# Patient Record
Sex: FEMALE | Race: OTHER | HISPANIC OR LATINO | Employment: OTHER | URBAN - METROPOLITAN AREA
[De-identification: names, ages, dates, MRNs, and addresses within clinical notes are randomized per-mention and may not be internally consistent; named-entity substitution may affect disease eponyms.]

---

## 2021-04-30 ENCOUNTER — HOSPITAL ENCOUNTER (EMERGENCY)
Facility: HOSPITAL | Age: 71
Discharge: HOME | End: 2021-05-01
Attending: EMERGENCY MEDICINE | Admitting: EMERGENCY MEDICINE
Payer: COMMERCIAL

## 2021-04-30 DIAGNOSIS — F11.10 OPIOID ABUSE (HCC): Primary | ICD-10-CM

## 2021-04-30 DIAGNOSIS — I10 HTN (HYPERTENSION): ICD-10-CM

## 2021-04-30 DIAGNOSIS — F11.13 OPIOID ABUSE WITH WITHDRAWAL (HCC): ICD-10-CM

## 2021-04-30 LAB
AMPHETAMINES SERPL QL SCN: NEGATIVE
ANION GAP SERPL CALCULATED.3IONS-SCNC: 4 MMOL/L (ref 5–14)
ATRIAL RATE: 56 BPM
BACTERIA UR QL AUTO: ABNORMAL /HPF
BARBITURATES UR QL: NEGATIVE
BASOPHILS # BLD AUTO: 0 THOUSANDS/ΜL (ref 0–0.1)
BASOPHILS NFR BLD AUTO: 1 % (ref 0–1)
BENZODIAZ UR QL: NEGATIVE
BILIRUB UR QL STRIP: NEGATIVE
BUN SERPL-MCNC: 28 MG/DL (ref 5–25)
CALCIUM SERPL-MCNC: 10 MG/DL (ref 8.4–10.2)
CHLORIDE SERPL-SCNC: 101 MMOL/L (ref 97–108)
CLARITY UR: CLEAR
CO2 SERPL-SCNC: 33 MMOL/L (ref 22–30)
COCAINE UR QL: NEGATIVE
COLOR UR: YELLOW
CREAT SERPL-MCNC: 0.91 MG/DL (ref 0.6–1.2)
EOSINOPHIL # BLD AUTO: 0.1 THOUSAND/ΜL (ref 0–0.4)
EOSINOPHIL NFR BLD AUTO: 2 % (ref 0–6)
ERYTHROCYTE [DISTWIDTH] IN BLOOD BY AUTOMATED COUNT: 13.7 %
ETHANOL EXG-MCNC: 0 MG/DL
GFR SERPL CREATININE-BSD FRML MDRD: 64 ML/MIN/1.73SQ M
GLUCOSE SERPL-MCNC: 121 MG/DL (ref 70–99)
GLUCOSE UR STRIP-MCNC: NEGATIVE MG/DL
HCT VFR BLD AUTO: 39 % (ref 36–46)
HGB BLD-MCNC: 13.1 G/DL (ref 12–16)
HGB UR QL STRIP.AUTO: 10
KETONES UR STRIP-MCNC: NEGATIVE MG/DL
LEUKOCYTE ESTERASE UR QL STRIP: 500
LYMPHOCYTES # BLD AUTO: 1.6 THOUSANDS/ΜL (ref 0.5–4)
LYMPHOCYTES NFR BLD AUTO: 33 % (ref 25–45)
MCH RBC QN AUTO: 33.5 PG (ref 26–34)
MCHC RBC AUTO-ENTMCNC: 33.7 G/DL (ref 31–36)
MCV RBC AUTO: 99 FL (ref 80–100)
METHADONE UR QL: NEGATIVE
MONOCYTES # BLD AUTO: 0.4 THOUSAND/ΜL (ref 0.2–0.9)
MONOCYTES NFR BLD AUTO: 8 % (ref 1–10)
MUCOUS THREADS UR QL AUTO: ABNORMAL
NEUTROPHILS # BLD AUTO: 2.8 THOUSANDS/ΜL (ref 1.8–7.8)
NEUTS SEG NFR BLD AUTO: 57 % (ref 45–65)
NITRITE UR QL STRIP: NEGATIVE
NON-SQ EPI CELLS URNS QL MICRO: ABNORMAL /HPF
OPIATES UR QL SCN: POSITIVE
OXYCODONE+OXYMORPHONE UR QL SCN: NEGATIVE
P AXIS: 43 DEGREES
PCP UR QL: NEGATIVE
PH UR STRIP.AUTO: 6.5 [PH]
PLATELET # BLD AUTO: 165 THOUSANDS/UL (ref 150–450)
PMV BLD AUTO: 8 FL (ref 8.9–12.7)
POTASSIUM SERPL-SCNC: 4 MMOL/L (ref 3.6–5)
PR INTERVAL: 174 MS
PROT UR STRIP-MCNC: NEGATIVE MG/DL
QRS AXIS: 9 DEGREES
QRSD INTERVAL: 92 MS
QT INTERVAL: 412 MS
QTC INTERVAL: 397 MS
RBC # BLD AUTO: 3.92 MILLION/UL (ref 4–5.2)
RBC #/AREA URNS AUTO: ABNORMAL /HPF
SARS-COV-2 RNA RESP QL NAA+PROBE: NEGATIVE
SODIUM SERPL-SCNC: 138 MMOL/L (ref 137–147)
SP GR UR STRIP.AUTO: 1.01 (ref 1–1.04)
T WAVE AXIS: 66 DEGREES
THC UR QL: NEGATIVE
UROBILINOGEN UA: NEGATIVE MG/DL
VENTRICULAR RATE: 56 BPM
WBC # BLD AUTO: 4.9 THOUSAND/UL (ref 4.5–11)
WBC #/AREA URNS AUTO: ABNORMAL /HPF

## 2021-04-30 PROCEDURE — 80307 DRUG TEST PRSMV CHEM ANLYZR: CPT | Performed by: PHYSICIAN ASSISTANT

## 2021-04-30 PROCEDURE — U0005 INFEC AGEN DETEC AMPLI PROBE: HCPCS | Performed by: PHYSICIAN ASSISTANT

## 2021-04-30 PROCEDURE — 36415 COLL VENOUS BLD VENIPUNCTURE: CPT | Performed by: PHYSICIAN ASSISTANT

## 2021-04-30 PROCEDURE — 80048 BASIC METABOLIC PNL TOTAL CA: CPT | Performed by: PHYSICIAN ASSISTANT

## 2021-04-30 PROCEDURE — 81003 URINALYSIS AUTO W/O SCOPE: CPT | Performed by: PHYSICIAN ASSISTANT

## 2021-04-30 PROCEDURE — 99284 EMERGENCY DEPT VISIT MOD MDM: CPT

## 2021-04-30 PROCEDURE — 82075 ASSAY OF BREATH ETHANOL: CPT | Performed by: PHYSICIAN ASSISTANT

## 2021-04-30 PROCEDURE — 85025 COMPLETE CBC W/AUTO DIFF WBC: CPT | Performed by: PHYSICIAN ASSISTANT

## 2021-04-30 PROCEDURE — 93010 ELECTROCARDIOGRAM REPORT: CPT | Performed by: INTERNAL MEDICINE

## 2021-04-30 PROCEDURE — U0003 INFECTIOUS AGENT DETECTION BY NUCLEIC ACID (DNA OR RNA); SEVERE ACUTE RESPIRATORY SYNDROME CORONAVIRUS 2 (SARS-COV-2) (CORONAVIRUS DISEASE [COVID-19]), AMPLIFIED PROBE TECHNIQUE, MAKING USE OF HIGH THROUGHPUT TECHNOLOGIES AS DESCRIBED BY CMS-2020-01-R: HCPCS | Performed by: PHYSICIAN ASSISTANT

## 2021-04-30 PROCEDURE — 81001 URINALYSIS AUTO W/SCOPE: CPT | Performed by: PHYSICIAN ASSISTANT

## 2021-04-30 PROCEDURE — 93005 ELECTROCARDIOGRAM TRACING: CPT

## 2021-04-30 RX ORDER — INDOMETHACIN 50 MG/1
CAPSULE ORAL
COMMUNITY
Start: 2021-03-29

## 2021-04-30 RX ORDER — HYDROCHLOROTHIAZIDE 25 MG/1
25 TABLET ORAL DAILY
COMMUNITY
Start: 2020-10-09 | End: 2021-05-01

## 2021-04-30 RX ORDER — NITROFURANTOIN 25; 75 MG/1; MG/1
100 CAPSULE ORAL 2 TIMES DAILY WITH MEALS
Status: DISCONTINUED | OUTPATIENT
Start: 2021-04-30 | End: 2021-05-01 | Stop reason: HOSPADM

## 2021-04-30 RX ORDER — BRIMONIDINE TARTRATE 2 MG/ML
SOLUTION/ DROPS OPHTHALMIC
COMMUNITY
Start: 2021-01-27

## 2021-04-30 RX ORDER — CLONIDINE HYDROCHLORIDE 0.1 MG/1
0.1 TABLET ORAL ONCE
Status: COMPLETED | OUTPATIENT
Start: 2021-04-30 | End: 2021-04-30

## 2021-04-30 RX ORDER — SIMVASTATIN 40 MG
TABLET ORAL
COMMUNITY
Start: 2020-12-04

## 2021-04-30 RX ORDER — LOSARTAN POTASSIUM 100 MG/1
100 TABLET ORAL
COMMUNITY
Start: 2021-03-25

## 2021-04-30 RX ORDER — HYDROCHLOROTHIAZIDE 25 MG/1
25 TABLET ORAL DAILY
Status: DISCONTINUED | OUTPATIENT
Start: 2021-05-01 | End: 2021-04-30

## 2021-04-30 RX ADMIN — NITROFURANTOIN (MONOHYDRATE/MACROCRYSTALS) 100 MG: 75; 25 CAPSULE ORAL at 19:10

## 2021-04-30 RX ADMIN — CLONIDINE HYDROCHLORIDE 0.1 MG: 0.1 TABLET ORAL at 20:20

## 2021-04-30 NOTE — ED PROVIDER NOTES
History  Chief Complaint   Patient presents with    Detox Evaluation     pt arrives requesting HOST services for daily heroin abuse, pt reports 3-5 bags daily  Used 3 today  Had a bed at [facility] however her BP caused them to deny her admission  79-year-old female with a past medical history of heroin abuse,anxiety, hypothyroidism, hypertension presents to the ED for evaluation of opioid detox and drug rehabilitation  Patient reports she has been using heroin for over 20 years  Last use this morning 3 bags, snorted  Patient typically uses 3 bags daily  Patient's daughter is at bedside and reports that she had a ED bed today at [place] however due to her elevated blood pressure he denied her admission  Was advised to come to the emergency room where there is a detox unit that can manage her blood pressure and 8 in detox  Patient reports she previously was sober from opiates 23 years ago, and lasted 20 years sober, and began using 3 years ago  Patient recalls vividly having withdrawal like symptoms that included nausea, vomiting, myalgia, chills, and fevers  At this time patient denies any withdrawal symptoms  Denies any fever, chills, chest pain, dyspnea, abdominal pain  Patient denies any suicidal and homicidal ideation  Patient reports poor compliance with home medications        History provided by:  Patient and relative   used: No    Detox Evaluation  Similar prior episodes: yes    Severity:  Mild  Duration:  1 day  Timing:  Constant  Progression:  Worsening  Chronicity:  New  Suspected agents:  Heroin  Associated symptoms: no abdominal pain, no agitation, no blackouts, no bladder incontinence, no bowel incontinence, no confusion, no hallucinations, no headaches, no loss of consciousness, no nausea, no palpitations, no seizures, no shortness of breath, no somnolence, no suicidal ideation, no violence, no vomiting and no weakness        Prior to Admission Medications Prescriptions Last Dose Informant Patient Reported? Taking? Aspirin Buf,CaCarb-MgCarb-MgO, 81 MG TABS   Yes No   Sig: Take 81 mg by mouth daily   Diclofenac Sodium (VOLTAREN) 1 %   Yes Yes   Sig: APPLY TO AFFECTED AREA AS NEEDED   amLODIPine (NORVASC) 10 mg tablet   Yes Yes   Sig: Take 10 mg by mouth daily   brimonidine tartrate 0 2 % ophthalmic solution   Yes Yes   Sig: INSTILL 1 DROP IN EACH EYE TWICE DAILY   citalopram (CeleXA) 20 mg tablet   Yes Yes   Sig: Take 20 mg by mouth daily   gabapentin (NEURONTIN) 300 mg capsule   Yes Yes   Sig: Take 300 mg by mouth 3 (three) times a day   indomethacin (INDOCIN) 50 mg capsule   Yes Yes   Sig: TAKE 1 CAPSULE BY MOUTH 3 TIMES DAILY WITH MEALS AS NEEDED FOR GOUT ATTACK   levothyroxine 75 mcg tablet   Yes Yes   Sig: Take 75 mcg by mouth daily in the early morning   losartan (COZAAR) 100 MG tablet   Yes Yes   Sig: Take 100 mg by mouth daily at bedtime    metoprolol tartrate (LOPRESSOR) 25 mg tablet   Yes Yes   Sig: Take 25 mg by mouth every 12 (twelve) hours   simvastatin (ZOCOR) 40 mg tablet   Yes Yes   Sig: TAKE 1 TABLET BY MOUTH EACH EVENING      Facility-Administered Medications: None       Past Medical History:   Diagnosis Date    Arthritis     Disease of thyroid gland     Hypertension        Past Surgical History:   Procedure Laterality Date    EAR SURGERY      TONSILLECTOMY      TUBAL LIGATION         History reviewed  No pertinent family history  I have reviewed and agree with the history as documented  E-Cigarette/Vaping     E-Cigarette/Vaping Substances     Social History     Tobacco Use    Smoking status: Current Every Day Smoker     Packs/day: 1 50     Types: Cigarettes    Smokeless tobacco: Never Used   Substance Use Topics    Alcohol use: Not Currently    Drug use: Yes     Types: Heroin, Fentanyl     Comment: 3-5 bags daily       Review of Systems   Constitutional: Negative for chills, diaphoresis, fatigue and fever     HENT: Negative for congestion, rhinorrhea and sore throat  Eyes: Negative for photophobia and visual disturbance  Respiratory: Negative for cough, shortness of breath and wheezing  Cardiovascular: Negative for chest pain and palpitations  Gastrointestinal: Negative for abdominal pain, bowel incontinence, diarrhea, nausea and vomiting  Genitourinary: Negative for bladder incontinence, difficulty urinating, dysuria and frequency  Musculoskeletal: Negative for back pain, gait problem, myalgias and neck pain  Skin: Negative for color change, rash and wound  Neurological: Negative for dizziness, tremors, seizures, loss of consciousness, syncope, weakness, light-headedness, numbness and headaches  Hematological: Negative for adenopathy  Does not bruise/bleed easily  Psychiatric/Behavioral: Negative for agitation, behavioral problems, confusion, hallucinations and suicidal ideas  The patient is not nervous/anxious  Physical Exam  Physical Exam  Vitals signs and nursing note reviewed  Constitutional:       General: She is not in acute distress  Appearance: Normal appearance  She is well-developed and normal weight  She is not ill-appearing, toxic-appearing or diaphoretic  Comments: Mild tired appearing on initial exam but orientated and able to hold appropriate conversation    HENT:      Head: Normocephalic and atraumatic  Right Ear: Tympanic membrane, ear canal and external ear normal  There is no impacted cerumen  Left Ear: Tympanic membrane, ear canal and external ear normal  There is no impacted cerumen  Nose: Nose normal  No congestion or rhinorrhea  Mouth/Throat:      Mouth: Mucous membranes are moist       Pharynx: Oropharynx is clear  No oropharyngeal exudate or posterior oropharyngeal erythema  Eyes:      General: No scleral icterus  Right eye: No discharge  Left eye: No discharge  Extraocular Movements: Extraocular movements intact  Conjunctiva/sclera: Conjunctivae normal       Pupils: Pupils are equal, round, and reactive to light  Comments: Pinpoint pupils on initial presentation   Neck:      Musculoskeletal: Normal range of motion  No neck rigidity or muscular tenderness  Cardiovascular:      Rate and Rhythm: Normal rate and regular rhythm  Pulses: Normal pulses  Heart sounds: Normal heart sounds  No murmur  Pulmonary:      Effort: Pulmonary effort is normal  No respiratory distress  Breath sounds: Normal breath sounds  No wheezing  Abdominal:      General: Bowel sounds are normal  There is no distension  Palpations: Abdomen is soft  There is no mass  Tenderness: There is no abdominal tenderness  There is no guarding or rebound  Musculoskeletal: Normal range of motion  General: No deformity or signs of injury  Right lower leg: No edema  Left lower leg: No edema  Lymphadenopathy:      Cervical: No cervical adenopathy  Skin:     General: Skin is warm and dry  Capillary Refill: Capillary refill takes less than 2 seconds  Coloration: Skin is not jaundiced or pale  Neurological:      Mental Status: She is alert and oriented to person, place, and time  Sensory: No sensory deficit  Motor: No weakness        Gait: Gait normal    Psychiatric:         Behavior: Behavior normal          Vital Signs  ED Triage Vitals [04/30/21 1721]   Temperature Pulse Respirations Blood Pressure SpO2   (!) 96 9 °F (36 1 °C) 72 16 (!) 190/86 96 %      Temp Source Heart Rate Source Patient Position - Orthostatic VS BP Location FiO2 (%)   Tympanic Monitor Sitting Left arm --      Pain Score       --           Vitals:    05/01/21 1444 05/01/21 1656 05/01/21 1833 05/01/21 2115   BP: 118/68 167/93 136/83 (!) 179/105   Pulse: 56 63 57 92   Patient Position - Orthostatic VS: Sitting Sitting  Lying         Visual Acuity  Visual Acuity      Most Recent Value   L Pupil Size (mm)  2   R Pupil Size (mm)  2          ED Medications  Medications   cloNIDine (CATAPRES) tablet 0 1 mg (0 1 mg Oral Given 4/30/21 2020)   amLODIPine (NORVASC) tablet 20 mg (20 mg Oral Given 5/1/21 0613)   gabapentin (NEURONTIN) capsule 300 mg (300 mg Oral Given 5/1/21 0613)   metoprolol tartrate (LOPRESSOR) tablet 25 mg (25 mg Oral Given 5/1/21 0614)   amLODIPine (NORVASC) tablet 10 mg (10 mg Oral Given 5/1/21 0939)   cloNIDine (CATAPRES) tablet 0 1 mg (0 1 mg Oral Given 5/1/21 2121)   LORazepam (ATIVAN) tablet 1 mg (1 mg Oral Given 5/1/21 2121)   LORazepam (ATIVAN) tablet 1 mg (1 mg Oral Given 5/1/21 2146)       Diagnostic Studies  Results Reviewed     Procedure Component Value Units Date/Time    Novel Coronavirus [facility] [[id number]]  (Normal) Collected: 04/30/21 1759    Lab Status: Final result Specimen: Nares from Nasopharyngeal Swab Updated: 04/30/21 1917     SARS-CoV-2 Negative    Narrative: The specimen collection materials, transport medium, and/or testing methodology utilized in the production of these test results have been proven to be reliable in a limited validation with an abbreviated program under the Emergency Utilization Authorization provided by the FDA  Testing reported as "Presumptive positive" will be confirmed with secondary testing to ensure result accuracy  Clinical caution and judgement should be used with the interpretation of these results with consideration of the clinical impression and other laboratory testing  Testing reported as "Positive" or "Negative" has been proven to be accurate according to standard laboratory validation requirements  All testing is performed with control materials showing appropriate reactivity at standard intervals        Urine Microscopic [[id number]]  (Abnormal) Collected: 04/30/21 1846    Lab Status: Final result Specimen: Urine, Clean Catch Updated: 04/30/21 1915     RBC, UA 0-1 /hpf      WBC, UA 2-4 /hpf      Epithelial Cells Occasional /hpf      Bacteria, UA Occasional /hpf      MUCUS THREADS Occasional    Rapid drug screen, urine [[id number]]  (Abnormal) Collected: 04/30/21 1840    Lab Status: Final result Specimen: Urine Updated: 04/30/21 1904     Amph/Meth UR Negative     Barbiturate Ur Negative     Benzodiazepine Urine Negative     Cocaine Urine Negative     Methadone Urine Negative     Opiate Urine Positive     PCP Ur Negative     THC Urine Negative     Oxycodone Urine Negative    Narrative:      Presumptive report  If requested, specimen will be sent to reference lab for confirmation  FOR MEDICAL PURPOSES ONLY  IF CONFIRMATION NEEDED PLEASE CONTACT THE LAB WITHIN 5 DAYS      Drug Screen Cutoff Levels:  AMPHETAMINE/METHAMPHETAMINES  1000 ng/mL  BARBITURATES     200 ng/mL  BENZODIAZEPINES     200 ng/mL  COCAINE      300 ng/mL  METHADONE      300 ng/mL  OPIATES      300 ng/mL  PHENCYCLIDINE     25 ng/mL  THC       50 ng/mL  OXYCODONE      100 ng/mL    UA w Reflex to Microscopic w Reflex to Culture [[id number]]  (Abnormal) Collected: 04/30/21 1846    Lab Status: Final result Specimen: Urine, Clean Catch Updated: 04/30/21 1849     Color, UA Yellow     Clarity, UA Clear     Specific Gravity, UA 1 015     pH, UA 6 5     Leukocytes,  0     Nitrite, UA Negative     Protein, UA Negative mg/dl      Glucose, UA Negative mg/dl      Ketones, UA Negative mg/dl      Bilirubin, UA Negative     Blood, UA 10 0     UROBILINOGEN UA Negative mg/dL     Basic metabolic panel [[id number]]  (Abnormal) Collected: 04/30/21 1816    Lab Status: Final result Specimen: Blood from Arm, Left Updated: 04/30/21 1846     Sodium 138 mmol/L      Potassium 4 0 mmol/L      Chloride 101 mmol/L      CO2 33 mmol/L      ANION GAP 4 mmol/L      BUN 28 mg/dL      Creatinine 0 91 mg/dL      Glucose 121 mg/dL      Calcium 10 0 mg/dL      eGFR 64 ml/min/1 73sq m     Narrative:      [place] guidelines for Chronic Kidney Disease (CKD):     Stage 1 with normal or high GFR (GFR > 90 mL/min/1 73 square meters)    Stage 2 Mild CKD (GFR = 60-89 mL/min/1 73 square meters)    Stage 3A Moderate CKD (GFR = 45-59 mL/min/1 73 square meters)    Stage 3B Moderate CKD (GFR = 30-44 mL/min/1 73 square meters)    Stage 4 Severe CKD (GFR = 15-29 mL/min/1 73 square meters)    Stage 5 End Stage CKD (GFR <15 mL/min/1 73 square meters)  Note: GFR calculation is accurate only with a steady state creatinine    CBC and differential [[id number]]  (Abnormal) Collected: 04/30/21 1816    Lab Status: Final result Specimen: Blood from Arm, Left Updated: 04/30/21 1832     WBC 4 90 Thousand/uL      RBC 3 92 Million/uL      Hemoglobin 13 1 g/dL      Hematocrit 39 0 %      MCV 99 fL      MCH 33 5 pg      MCHC 33 7 g/dL      RDW 13 7 %      MPV 8 0 fL      Platelets 457 Thousands/uL      Neutrophils Relative 57 %      Lymphocytes Relative 33 %      Monocytes Relative 8 %      Eosinophils Relative 2 %      Basophils Relative 1 %      Neutrophils Absolute 2 80 Thousands/µL      Lymphocytes Absolute 1 60 Thousands/µL      Monocytes Absolute 0 40 Thousand/µL      Eosinophils Absolute 0 10 Thousand/µL      Basophils Absolute 0 00 Thousands/µL     POCT alcohol breath test [[id number]]  (Normal) Resulted: 04/30/21 1828    Lab Status: Final result Updated: 04/30/21 1828     EXTBreath Alcohol 0 00                 No orders to display              Procedures  ECG 12 Lead Documentation Only    Date/Time: 5/1/2021 2:13 PM  Performed by: Shawn العلي PA-C  Authorized by: Shawn العلي PA-C     Indications / Diagnosis:  Detox eval  ECG reviewed by me, the ED Provider: yes    Patient location:  ED  Previous ECG:     Previous ECG:  Compared to current    Similarity:  Changes noted  Comments:      Sinus bradycardia with sinus arrhythmia  Nonspecific T wave abnormality  Abnormal ECG  When compared with ECG of 26-JUN-2007 20:01,  Nonspecific T wave abnormality now evident in Lateral leads             ED Course  ED Course as of May 04 0134   Fri Apr 30, 2021   1857 Detox decline admission for HTN and opiate withdrawal      1857 Will request HOST  However patient had a bed placement today at [facility] but was declined later due to hypertension  1936 Detox confirmed no placement  2013 Plan to control BP than HOST      2048 /65 p 0 1 PO clonidine, informed crisis, HOST to assess pt, pending placement at this time        2123 Daughter contact # [phone], attempted to contact to update, left VM                                  SBIRT 20yo+      Most Recent Value   SBIRT (23 yo +)   In order to provide better care to our patients, we are screening all of our patients for alcohol and drug use  Would it be okay to ask you these screening questions? Unable to answer at this time Filed at: 04/30/2021 1836                    MDM  Number of Diagnoses or Management Options  HTN (hypertension): new and requires workup  Opioid abuse [facility]): new and requires workup  Opioid abuse with withdrawal [facility]): new and requires workup  Diagnosis management comments:  59-year-old female with a past medical history of heroin abuse,anxiety, hypothyroidism, hypertension presents to the ED for evaluation of opioid detox and drug rehabilitation  Patient last used 3 bags morning  UA positive for opiates  Patient presented with mild opioid intoxication  UA positive for opiates  Other labs reviewed acute cystitis  Treated with Macrobid  Patient was refused from a rehab bed this morning due to elevated blood pressure  Patient has poor compliance at home medication  Provided 0 1 mg clonidine with improvement in BP  Evaluated by ED crisis and host   Placement found and pt will be transferred to [facility] 2 center from ED  Patient provided home medications in the ED          Amount and/or Complexity of Data Reviewed  Clinical lab tests: ordered and reviewed  Tests in the radiology section of CPT®: ordered and reviewed  Review and summarize past medical records: yes  Discuss the patient with other providers: yes  Independent visualization of images, tracings, or specimens: yes    Risk of Complications, Morbidity, and/or Mortality  Presenting problems: moderate  Diagnostic procedures: moderate  Management options: moderate    Patient Progress  Patient progress: stable      Disposition  Final diagnoses:   Opioid abuse ([facility] [address] )   Opioid abuse with withdrawal ([facility] [address] )   HTN (hypertension)     Time reflects when diagnosis was documented in both MDM as applicable and the Disposition within this note     Time User Action Codes Description Comment    5/1/2021  9:20 PM James Edgar Add [F10 10] Alcohol abuse     5/1/2021  9:20 PM Prabha Staley Remove [F10 10] Alcohol abuse     5/1/2021  9:21 PM James Edgar Add [F11 10] Opioid abuse ([facility] [address] )     5/1/2021  9:21 PM James Edgar Add [F11 13] Opioid abuse with withdrawal ([facility] [address] )     5/1/2021  9:21 PM Anna Staley Add [I10] HTN (hypertension)       ED Disposition     ED Disposition Condition Date/Time Comment    Discharge Stable Sat May 1, 2021  9:20 PM Akira Gonzalez discharge to home/self care              Follow-up Information     Follow up With Specialties Details Why Contact Info Additional [address] [facility] Medicine Schedule an appointment as soon as possible for a visit in 1 week  [address], [address] [facility] [address]  [address], [address], [address], [address] [place] [address]    Marisela Hernandez MD Internal Medicine   [facility] [address] [place] [address],[address]  [phone]             Discharge Medication List as of 5/1/2021  9:21 PM      CONTINUE these medications which have NOT CHANGED    Details   amLODIPine (NORVASC) 10 mg tablet Take 10 mg by mouth daily, Historical Med      brimonidine tartrate 0 2 % ophthalmic solution INSTILL 1 DROP IN [facility] EYE TWICE DAILY, Historical Med      citalopram (CeleXA) 20 mg tablet Take 20 mg by mouth daily, Historical Med      Diclofenac Sodium (VOLTAREN) 1 % APPLY TO AFFECTED AREA AS NEEDED, Historical Med      gabapentin (NEURONTIN) 300 mg capsule Take 300 mg by mouth 3 (three) times a day, Historical Med      indomethacin (INDOCIN) 50 mg capsule TAKE 1 CAPSULE BY MOUTH 3 TIMES DAILY WITH MEALS AS NEEDED FOR GOUT ATTACK, Historical Med      levothyroxine 75 mcg tablet Take 75 mcg by mouth daily in the early morning, Historical Med      losartan (COZAAR) 100 MG tablet Take 100 mg by mouth daily at bedtime , Starting u 3/25/2021, Historical Med      metoprolol tartrate (LOPRESSOR) 25 mg tablet Take 25 mg by mouth every 12 (twelve) hours, Historical Med      simvastatin (ZOCOR) 40 mg tablet TAKE 1 TABLET BY MOUTH EACH EVENING, Historical Med      Aspirin Buf,CaCarb-MgCarb-MgO, 81 MG TABS Take 81 mg by mouth daily, Historical Med           No discharge procedures on file      PDMP Review     None          ED Provider  Electronically Signed by           Elaine Hoover PA-C  05/04/21 0134

## 2021-05-01 VITALS
DIASTOLIC BLOOD PRESSURE: 105 MMHG | TEMPERATURE: 98 F | OXYGEN SATURATION: 100 % | HEART RATE: 92 BPM | WEIGHT: 88.85 LBS | SYSTOLIC BLOOD PRESSURE: 179 MMHG | RESPIRATION RATE: 16 BRPM

## 2021-05-01 PROCEDURE — 99284 EMERGENCY DEPT VISIT MOD MDM: CPT | Performed by: EMERGENCY MEDICINE

## 2021-05-01 RX ORDER — LORAZEPAM 1 MG/1
1 TABLET ORAL ONCE
Status: COMPLETED | OUTPATIENT
Start: 2021-05-01 | End: 2021-05-01

## 2021-05-01 RX ORDER — LOSARTAN POTASSIUM 50 MG/1
100 TABLET ORAL
Status: DISCONTINUED | OUTPATIENT
Start: 2021-05-01 | End: 2021-05-01 | Stop reason: HOSPADM

## 2021-05-01 RX ORDER — LEVOTHYROXINE SODIUM 0.07 MG/1
75 TABLET ORAL
COMMUNITY

## 2021-05-01 RX ORDER — AMLODIPINE BESYLATE 5 MG/1
20 TABLET ORAL ONCE
Status: COMPLETED | OUTPATIENT
Start: 2021-05-01 | End: 2021-05-01

## 2021-05-01 RX ORDER — CLONIDINE HYDROCHLORIDE 0.1 MG/1
0.1 TABLET ORAL ONCE
Status: COMPLETED | OUTPATIENT
Start: 2021-05-01 | End: 2021-05-01

## 2021-05-01 RX ORDER — GABAPENTIN 300 MG/1
300 CAPSULE ORAL 3 TIMES DAILY
COMMUNITY

## 2021-05-01 RX ORDER — AMLODIPINE BESYLATE 5 MG/1
10 TABLET ORAL ONCE
Status: COMPLETED | OUTPATIENT
Start: 2021-05-01 | End: 2021-05-01

## 2021-05-01 RX ORDER — METOPROLOL TARTRATE 50 MG/1
25 TABLET, FILM COATED ORAL ONCE
Status: COMPLETED | OUTPATIENT
Start: 2021-05-01 | End: 2021-05-01

## 2021-05-01 RX ORDER — LEVOTHYROXINE SODIUM 0.07 MG/1
75 TABLET ORAL
Status: DISCONTINUED | OUTPATIENT
Start: 2021-05-01 | End: 2021-05-01 | Stop reason: HOSPADM

## 2021-05-01 RX ORDER — GABAPENTIN 100 MG/1
300 CAPSULE ORAL ONCE
Status: COMPLETED | OUTPATIENT
Start: 2021-05-01 | End: 2021-05-01

## 2021-05-01 RX ORDER — CITALOPRAM 20 MG/1
20 TABLET ORAL DAILY
COMMUNITY

## 2021-05-01 RX ORDER — AMLODIPINE BESYLATE 10 MG/1
10 TABLET ORAL DAILY
COMMUNITY

## 2021-05-01 RX ORDER — HYDRALAZINE HYDROCHLORIDE 20 MG/ML
10 INJECTION INTRAMUSCULAR; INTRAVENOUS ONCE
Status: DISCONTINUED | OUTPATIENT
Start: 2021-05-01 | End: 2021-05-01 | Stop reason: HOSPADM

## 2021-05-01 RX ORDER — LORAZEPAM 2 MG/ML
1 INJECTION INTRAMUSCULAR ONCE
Status: DISCONTINUED | OUTPATIENT
Start: 2021-05-01 | End: 2021-05-01

## 2021-05-01 RX ORDER — CITALOPRAM 10 MG/1
10 TABLET ORAL DAILY
Status: DISCONTINUED | OUTPATIENT
Start: 2021-05-01 | End: 2021-05-01 | Stop reason: HOSPADM

## 2021-05-01 RX ORDER — PRAVASTATIN SODIUM 40 MG
80 TABLET ORAL
Status: DISCONTINUED | OUTPATIENT
Start: 2021-05-01 | End: 2021-05-01 | Stop reason: HOSPADM

## 2021-05-01 RX ADMIN — GABAPENTIN 300 MG: 100 CAPSULE ORAL at 06:13

## 2021-05-01 RX ADMIN — AMLODIPINE BESYLATE 10 MG: 5 TABLET ORAL at 09:39

## 2021-05-01 RX ADMIN — LORAZEPAM 1 MG: 1 TABLET ORAL at 21:21

## 2021-05-01 RX ADMIN — LORAZEPAM 1 MG: 1 TABLET ORAL at 21:46

## 2021-05-01 RX ADMIN — LOSARTAN POTASSIUM 100 MG: 50 TABLET, FILM COATED ORAL at 16:57

## 2021-05-01 RX ADMIN — CITALOPRAM HYDROBROMIDE 10 MG: 10 TABLET ORAL at 08:26

## 2021-05-01 RX ADMIN — AMLODIPINE BESYLATE 20 MG: 5 TABLET ORAL at 06:13

## 2021-05-01 RX ADMIN — NITROFURANTOIN (MONOHYDRATE/MACROCRYSTALS) 100 MG: 75; 25 CAPSULE ORAL at 08:25

## 2021-05-01 RX ADMIN — CLONIDINE HYDROCHLORIDE 0.1 MG: 0.1 TABLET ORAL at 21:21

## 2021-05-01 RX ADMIN — NITROFURANTOIN (MONOHYDRATE/MACROCRYSTALS) 100 MG: 75; 25 CAPSULE ORAL at 16:54

## 2021-05-01 RX ADMIN — PRAVASTATIN SODIUM 80 MG: 40 TABLET ORAL at 16:55

## 2021-05-01 RX ADMIN — METOPROLOL TARTRATE 25 MG: 50 TABLET, FILM COATED ORAL at 06:14

## 2021-05-01 RX ADMIN — LEVOTHYROXINE SODIUM 75 MCG: 75 TABLET ORAL at 07:17

## 2021-05-01 NOTE — ED NOTES
Pt bed and person checked for contraband  Belongings placed in duffel bag and put in front of lockers  Cell phone remains in room d/t HOST and pt contact        Dee Patel RN  05/01/21 0566

## 2021-05-01 NOTE — ED NOTES
3 5 pills found in pt's bed during IV administration  Pills appear to be pt's morning medications that were documented as given at 0613  Pt unaware that she had not taken them  Pills removed from room and provider notified        Pills disposed of into sharps container witnessed by [facility] PHILIP Miranda RN  05/01/21 7128

## 2021-05-01 NOTE — ED NOTES
Pt currently speaking with [facility] on her cell phone     Karolina Fairbanks RN  05/01/21 2335

## 2021-05-01 NOTE — ED NOTES
Pt found scratching herself with a piece of a green plastic straw  Pt states she carries it with her so she can scratch her hands and ankles d/t itchiness  Pt requesting something for anxiety, provider aware        Kecia Coleman RN  05/01/21 8258

## 2021-05-01 NOTE — ED NOTES
Patient is sleeping on comfortably on the stretcher, no distress noted  Will continue to monitor       Karson Go RN  04/30/21 9339

## 2021-05-01 NOTE — ED NOTES
Pt pleasant and cooperative during medication administration  Pt reports she feels "so so"  Pt requesting warm blankets, 2 warm blankets provided and pt continues to rest comfortably        Shaunna Morales RN  05/01/21 6953

## 2021-05-01 NOTE — ED NOTES
Patient is accepted at [facility] for detox per Janet Emery  precert to be completed by HOST  Transportation is arranged with [facility]  Transportation is scheduled for 2130    Notified [facility] of transport

## 2021-05-02 NOTE — ED PROVIDER NOTES
Called to the room as patient was requesting to leave  Discussed with patient with daughter on the phone who was on speaker at patient just feels jittery  Discussed with her that I do feels imperative that she go for rehab for withdrawal   Will give another mg of Ativan  [facility] here for transfer  Patient is alert oriented x3, cranial nerves 2-12 grossly intact  EOMI  PERRLA  Patient moving in bed in no respiratory distress and with full active range of motion of the bilateral upper extremities and lower extremities  Maintaining airway    Maintaining secretions     Yudith Arellano DO  05/01/21 8366

## 2021-06-14 ENCOUNTER — HOSPITAL ENCOUNTER (EMERGENCY)
Facility: HOSPITAL | Age: 71
Discharge: HOME | End: 2021-06-14
Attending: EMERGENCY MEDICINE
Payer: COMMERCIAL

## 2021-06-14 VITALS
RESPIRATION RATE: 16 BRPM | TEMPERATURE: 98.4 F | DIASTOLIC BLOOD PRESSURE: 97 MMHG | WEIGHT: 83.11 LBS | SYSTOLIC BLOOD PRESSURE: 201 MMHG | OXYGEN SATURATION: 97 % | HEART RATE: 73 BPM

## 2021-06-14 DIAGNOSIS — J06.9 VIRAL URI WITH COUGH: Primary | ICD-10-CM

## 2021-06-14 DIAGNOSIS — Z20.822 ENCOUNTER FOR LABORATORY TESTING FOR COVID-19 VIRUS: ICD-10-CM

## 2021-06-14 LAB — SARS-COV-2 RNA RESP QL NAA+PROBE: NEGATIVE

## 2021-06-14 PROCEDURE — 99283 EMERGENCY DEPT VISIT LOW MDM: CPT

## 2021-06-14 PROCEDURE — U0003 INFECTIOUS AGENT DETECTION BY NUCLEIC ACID (DNA OR RNA); SEVERE ACUTE RESPIRATORY SYNDROME CORONAVIRUS 2 (SARS-COV-2) (CORONAVIRUS DISEASE [COVID-19]), AMPLIFIED PROBE TECHNIQUE, MAKING USE OF HIGH THROUGHPUT TECHNOLOGIES AS DESCRIBED BY CMS-2020-01-R: HCPCS | Performed by: PHYSICIAN ASSISTANT

## 2021-06-14 PROCEDURE — U0005 INFEC AGEN DETEC AMPLI PROBE: HCPCS | Performed by: PHYSICIAN ASSISTANT

## 2021-06-14 PROCEDURE — 99282 EMERGENCY DEPT VISIT SF MDM: CPT | Performed by: PHYSICIAN ASSISTANT

## 2021-06-14 NOTE — ED PROVIDER NOTES
HPI: Patient is a 66 y o  female who presents with 7 days of cough, headache and myalgias which the patient describes at mild The patient has had contact with people with similar symptoms  The patient taken OTC medication with relief of symptoms  No Known Allergies    Past Medical History:   Diagnosis Date    Arthritis     Disease of thyroid gland     Hypertension       Past Surgical History:   Procedure Laterality Date    EAR SURGERY      TONSILLECTOMY      TUBAL LIGATION       Social History     Tobacco Use    Smoking status: Current Every Day Smoker     Packs/day: 1 50     Types: Cigarettes    Smokeless tobacco: Never Used   Vaping Use    Vaping Use: Never used   Substance Use Topics    Alcohol use: Not Currently    Drug use: Not Currently     Types: Heroin, Fentanyl     Comment: 3-5 bags daily       Nursing notes reviewed  Physical Exam:  ED Triage Vitals [06/14/21 0952]   Temperature Pulse Respirations Blood Pressure SpO2   98 4 °F (36 9 °C) 73 16 (!) 201/97 97 %      Temp Source Heart Rate Source Patient Position - Orthostatic VS BP Location FiO2 (%)   Oral -- -- -- --      Pain Score       6           ROS: Positive for cough, congestion, body aches and headache, the remainder of a 10 organ system ROS was otherwise unremarkable  General: awake, alert, no acute distress    Head: normocephalic, atraumatic    Eyes: no scleral icterus  Ears: external ears normal, hearing grossly intact  Nose: external exam grossly normal, positive nasal discharge  Neck: symmetric, No JVD noted, trachea midline  Pulmonary: no respiratory distress, no tachypnea noted  Cardiovascular: appears well perfused  Abdomen: no distention noted  Musculoskeletal: no deformities noted, tone normal  Neuro: grossly non-focal  Psych: mood and affect appropriate    The patient is stable and has a history and physical exam consistent with a viral illness  COVID19 testing has been performed    I considered the patient's other medical conditions as applicable/noted above in my medical decision making  The patient is stable upon discharge  The plan is for supportive care at home  The patient (and any family present) verbalized understanding of the discharge instructions and warnings that would necessitate return to the Emergency Department  All questions were answered prior to discharge  Discussed reasons to come back to the ED including worsening SOB and low pulse ox <90%  Discussed supportive care, sleeping at an incline and self proning at home to help with breathing symptoms  Quick COVID-19 Severity Index (Predicts 24-hr risk of critical respiratory illness in patient's admitted from ED with COVID-19 )   Respiratory rate: <22 = 0  Pulse oximetry >92 = 0  O2 flow rate (L/min) <2 = 0  Result = 0 (low risk - 4% risk of critical illness at 24 hours)      Medications - No data to display  Final diagnoses:   Viral URI with cough   Encounter for laboratory testing for COVID-19 virus     Time reflects when diagnosis was documented in both MDM as applicable and the Disposition within this note     Time User Action Codes Description Comment    6/14/2021 10:35 AM Mackenzie Tucker [J06 [id number]] Viral URI with cough     6/14/2021 10:35 AM Mackenzie Tucker [[id number]] Encounter for laboratory testing for COVID-19 virus       ED Disposition     ED Disposition Condition Date/Time Comment    Discharge Stable Mon Jun 14, 2021 10:35 AM Joanne Bates discharge to home/self care  Follow-up Information     Follow up With Specialties Details Why Contact Info    Pop Mccarthy MD Internal Medicine Schedule an appointment as soon as possible for a visit   [address] [place] [address],[address] [place] [address]  [address] [place] [address]  [phone]          Patient's Medications   Discharge Prescriptions    No medications on file     No discharge procedures on file      Electronically Signed by       Tori Bach PA-C  06/14/21 4920

## 2024-09-12 ENCOUNTER — TELEPHONE (OUTPATIENT)
Age: 74
End: 2024-09-12

## 2024-09-12 NOTE — TELEPHONE ENCOUNTER
Caller: Zoila from [facility]    Doctor: not spa    Reason for call: Zoila was calling for pt records.  She could not confirm 3 forms of HIPAA for the pt.  Zoila was very persistent to get info and records for the pt.  End of call I told her I could not assist her.  I also told her she has the wrong dept multiple times and she kept persisting    Call back#: n/a

## 2024-09-20 NOTE — TELEPHONE ENCOUNTER
Caller: Zoila from [facility]    Doctor: not spa    Reason for call: looking for pt records.  Told her to call [facility] pt is not with [facility]    Call back#: n/a